# Patient Record
Sex: FEMALE | Race: WHITE | Employment: PART TIME | URBAN - METROPOLITAN AREA
[De-identification: names, ages, dates, MRNs, and addresses within clinical notes are randomized per-mention and may not be internally consistent; named-entity substitution may affect disease eponyms.]

---

## 2019-10-31 ENCOUNTER — HOSPITAL ENCOUNTER (EMERGENCY)
Age: 29
Discharge: HOME | End: 2019-10-31
Payer: COMMERCIAL

## 2019-10-31 VITALS
TEMPERATURE: 98.2 F | HEIGHT: 67 IN | RESPIRATION RATE: 16 BRPM | OXYGEN SATURATION: 98 % | HEART RATE: 104 BPM | DIASTOLIC BLOOD PRESSURE: 68 MMHG | BODY MASS INDEX: 18.05 KG/M2 | WEIGHT: 115 LBS | SYSTOLIC BLOOD PRESSURE: 125 MMHG

## 2019-10-31 DIAGNOSIS — T78.40XA ALLERGIC REACTION, INITIAL ENCOUNTER: Primary | ICD-10-CM

## 2019-10-31 PROCEDURE — 6370000000 HC RX 637 (ALT 250 FOR IP): Performed by: PHYSICIAN ASSISTANT

## 2019-10-31 PROCEDURE — 6360000002 HC RX W HCPCS: Performed by: PHYSICIAN ASSISTANT

## 2019-10-31 PROCEDURE — 99282 EMERGENCY DEPT VISIT SF MDM: CPT

## 2019-10-31 RX ORDER — DEXAMETHASONE SODIUM PHOSPHATE 10 MG/ML
10 INJECTION INTRAMUSCULAR; INTRAVENOUS ONCE
Status: COMPLETED | OUTPATIENT
Start: 2019-10-31 | End: 2019-10-31

## 2019-10-31 RX ORDER — DIPHENHYDRAMINE HCL 25 MG
25 TABLET ORAL ONCE
Status: COMPLETED | OUTPATIENT
Start: 2019-10-31 | End: 2019-10-31

## 2019-10-31 RX ORDER — FAMOTIDINE 20 MG/1
20 TABLET, FILM COATED ORAL ONCE
Status: COMPLETED | OUTPATIENT
Start: 2019-10-31 | End: 2019-10-31

## 2019-10-31 RX ORDER — DIPHENHYDRAMINE HCL 25 MG
25 CAPSULE ORAL EVERY 6 HOURS PRN
Qty: 40 CAPSULE | Refills: 0 | Status: SHIPPED | OUTPATIENT
Start: 2019-10-31 | End: 2019-11-10

## 2019-10-31 RX ORDER — FAMOTIDINE 20 MG/1
20 TABLET, FILM COATED ORAL 2 TIMES DAILY
Qty: 20 TABLET | Refills: 0 | Status: SHIPPED | OUTPATIENT
Start: 2019-10-31 | End: 2021-10-20

## 2019-10-31 RX ORDER — PREDNISONE 20 MG/1
40 TABLET ORAL DAILY
Qty: 10 TABLET | Refills: 0 | Status: SHIPPED | OUTPATIENT
Start: 2019-10-31 | End: 2019-11-05

## 2019-10-31 RX ADMIN — FAMOTIDINE 20 MG: 20 TABLET ORAL at 12:53

## 2019-10-31 RX ADMIN — DIPHENHYDRAMINE HCL 25 MG: 25 TABLET ORAL at 12:53

## 2019-10-31 RX ADMIN — DEXAMETHASONE SODIUM PHOSPHATE 10 MG: 10 INJECTION INTRAMUSCULAR; INTRAVENOUS at 12:54

## 2021-10-01 ENCOUNTER — TELEPHONE (OUTPATIENT)
Dept: BREAST CENTER | Age: 31
End: 2021-10-01

## 2021-10-01 NOTE — TELEPHONE ENCOUNTER
Attempted to reach patient in reference to her referral to the breast clinic. Unable to leave message. Letter sent to listed address in attempt to reach patient another way.

## 2021-10-11 ENCOUNTER — TELEPHONE (OUTPATIENT)
Dept: BREAST CENTER | Age: 31
End: 2021-10-11

## 2021-10-11 DIAGNOSIS — N64.9 BREAST LESION: Primary | ICD-10-CM

## 2021-10-11 NOTE — TELEPHONE ENCOUNTER
Spoke with Marylou at length. We were given the wrong contact information and that is why we could not get in touch with her. She is a new referral, imaging at [place] that is recommending left breast stereotactic biopsy. Order placed, along with information to patient with plans moving forward.  Patient appreciative and states no matter what the biopsy shows, she would like a clinical follow up with one of our providers

## 2021-10-20 ENCOUNTER — OFFICE VISIT (OUTPATIENT)
Dept: BREAST CENTER | Age: 31
End: 2021-10-20
Payer: COMMERCIAL

## 2021-10-20 ENCOUNTER — HOSPITAL ENCOUNTER (OUTPATIENT)
Dept: GENERAL RADIOLOGY | Age: 31
Discharge: HOME | End: 2021-10-22
Payer: COMMERCIAL

## 2021-10-20 VITALS
OXYGEN SATURATION: 98 % | WEIGHT: 115 LBS | RESPIRATION RATE: 18 BRPM | BODY MASS INDEX: 18.48 KG/M2 | HEIGHT: 66 IN | SYSTOLIC BLOOD PRESSURE: 110 MMHG | DIASTOLIC BLOOD PRESSURE: 68 MMHG | HEART RATE: 104 BPM | TEMPERATURE: 97.6 F

## 2021-10-20 DIAGNOSIS — N64.9 BREAST LESION: ICD-10-CM

## 2021-10-20 DIAGNOSIS — N61.1 BREAST ABSCESS: ICD-10-CM

## 2021-10-20 PROCEDURE — 99203 OFFICE O/P NEW LOW 30 MIN: CPT | Performed by: NURSE PRACTITIONER

## 2021-10-20 PROCEDURE — 76642 ULTRASOUND BREAST LIMITED: CPT

## 2021-10-20 PROCEDURE — G0279 TOMOSYNTHESIS, MAMMO: HCPCS

## 2021-10-20 RX ORDER — CLINDAMYCIN HYDROCHLORIDE 300 MG/1
300 CAPSULE ORAL 3 TIMES DAILY
Qty: 30 CAPSULE | Refills: 0 | Status: SHIPPED | OUTPATIENT
Start: 2021-10-20 | End: 2021-10-30

## 2021-10-20 RX ORDER — MULTIVIT WITH MINERALS/LUTEIN
1000 TABLET ORAL DAILY
Status: ON HOLD | COMMUNITY
End: 2022-10-12 | Stop reason: HOSPADM

## 2021-10-20 RX ORDER — LORATADINE 10 MG/1
10 CAPSULE, LIQUID FILLED ORAL DAILY
Status: ON HOLD | COMMUNITY
End: 2022-10-12 | Stop reason: HOSPADM

## 2021-10-20 RX ORDER — FLUCONAZOLE 150 MG/1
150 TABLET ORAL ONCE
Qty: 1 TABLET | Refills: 0 | Status: SHIPPED | OUTPATIENT
Start: 2021-10-20 | End: 2021-10-20

## 2021-10-20 ASSESSMENT — ENCOUNTER SYMPTOMS
TROUBLE SWALLOWING: 0
SHORTNESS OF BREATH: 0
NAUSEA: 0
ABDOMINAL DISTENTION: 0
CONSTIPATION: 0
CHEST TIGHTNESS: 0
STRIDOR: 0
SINUS PRESSURE: 0
ABDOMINAL PAIN: 0
DIARRHEA: 0
VOICE CHANGE: 0
SINUS PAIN: 0
RHINORRHEA: 0
WHEEZING: 0
BLOOD IN STOOL: 0
BACK PAIN: 0
SORE THROAT: 0
VOMITING: 0
COUGH: 0
EYE ITCHING: 0
EYE DISCHARGE: 0
CHOKING: 0

## 2021-10-20 NOTE — PROGRESS NOTES
Subjective:      Patient ID: Yoly Chand is a 36 y.o. female. HPI  History and Physical    Patient's Name/Date of Birth: Yoly Chand / 1990    Date: 2021     Yoly Chand presents for evaluation of a Left breast lesion, abscess    PCP: Shayan Garcia MD. Gynecologist:none. The lesion is located in the subareolar  position of the Left breast. The lesion was discovered by exam. Patient was on antibiotics for 2 weeks. The swelling of breast subsided while on antibiotics but then became worse once antibiotics stopped. The patient has noted a change in BSE since presentation. Patient denies nipple discharge. Patient denies a personal history of breast cancer. Breast cancer risk factors include female, third cousin on mom's side with breast cancer age 36. Ashkenazi Hol[employer] Ancestry: No.    OBSTETRIC RELATED HISTORY:  Age of menarche was 17. Age of menopause was   LMP 10/5/2021  Patient denies hormonal therapy. Patient is . Age of first live birth was 27. Patient did not breast feed. Is patient interested in fertility information about fertility preservation? No    CANCER SURVEILLANCE HISTORY:  Mammograms: Yes   Breast MRI's: No   Breast Biopsies: No   Colonoscopy: Not Applicable   GI Polyps: Not Applicable   EGD: Not Applicable   Pelvic Exam: No   Pap Smear: No 5 YEARS AGO  Dermatology: No   Lung screening: no        Estimated body mass index is 18.01 kg/m² as calculated from the following:    Height as of 10/31/19: 5' 7\" (1.702 m). Weight as of 10/31/19: 115 lb (52.2 kg). Bra Size: 36C    Because violence is so common, we ask all our patients: are you in a relationship or do you live with a person who threatens, hurts, or controls you:  DENIES    Patient drinks moderate caffeinated beverages. Patient does smoke cigarettes. Patient does not use recreational drugs.       Past Medical History:   Diagnosis Date    Anemia 2016       No past surgical history on file. Current Outpatient Medications   Medication Sig Dispense Refill    famotidine (PEPCID) 20 MG tablet Take 1 tablet by mouth 2 times daily for 10 days 20 tablet 0    ibuprofen (ADVIL;MOTRIN) 800 MG tablet Take 1 tablet by mouth every 8 hours as needed for Pain 120 tablet 3    docusate sodium (COLACE, DULCOLAX) 100 MG CAPS Take 100 mg by mouth 2 times daily 60 capsule 1    ferrous sulfate 325 (65 FE) MG tablet Take 1 tablet by mouth 2 times daily (with meals) 60 tablet 1    Prenatal MV-Min-Fe Fum-FA-DHA (PRENATAL 1 PO) Take 1 tablet by mouth       No current facility-administered medications for this visit. No Known Allergies    FH reviewed and non-contributory    Social History     Socioeconomic History    Marital status:      Spouse name: Not on file    Number of children: Not on file    Years of education: Not on file    Highest education level: Not on file   Occupational History    Not on file   Tobacco Use    Smoking status: Never Smoker   Substance and Sexual Activity    Alcohol use: No    Drug use: No    Sexual activity: Yes     Partners: Male   Other Topics Concern    Not on file   Social History Narrative    Not on file     Social Determinants of Health     Financial Resource Strain:     Difficulty of Paying Living Expenses:    Food Insecurity:     Worried About Running Out of Food in the Last Year:     [address] in the Last Year:    Transportation Needs:     Lack of Transportation (Medical):      Lack of Transportation (Non-Medical):    Physical Activity:     Days of Exercise per Week:     Minutes of Exercise per Session:    Stress:     Feeling of Stress :    Social Connections:     Frequency of Communication with Friends and Family:     Frequency of Social Gatherings with Friends and Family:     Attends [employer] Services:     Active Member of Clubs or Organizations:     Attends Club or Organization Meetings:     Marital Status:    Intimate Partner Violence:     Fear of Current or Ex-Partner:     Emotionally Abused:     Physically Abused:     Sexually Abused:        Occupation: PHARMACY TECH    Review of Systems   Constitutional: Negative for activity change, appetite change, chills, diaphoresis, fatigue, fever and unexpected weight change. Denies fever, rigors, chills. Discomfort in the central left breast.   HENT: Negative for congestion, postnasal drip, rhinorrhea, sinus pressure, sinus pain, sore throat, trouble swallowing and voice change. Eyes: Negative for discharge, itching and visual disturbance. Respiratory: Negative for cough, choking, chest tightness, shortness of breath, wheezing and stridor. Cardiovascular: Negative for chest pain, palpitations and leg swelling. Gastrointestinal: Negative for abdominal distention, abdominal pain, blood in stool, constipation, diarrhea, nausea and vomiting. Endocrine: Negative for cold intolerance and heat intolerance. Genitourinary: Negative for difficulty urinating, dysuria, frequency and hematuria. Musculoskeletal: Negative for arthralgias, back pain, gait problem, joint swelling, myalgias, neck pain and neck stiffness. Allergic/Immunologic: Negative for environmental allergies and food allergies. Neurological: Negative for dizziness, seizures, syncope, speech difficulty, weakness, light-headedness and headaches. Hematological: Negative for adenopathy. Does not bruise/bleed easily. Psychiatric/Behavioral: Negative for agitation, confusion and decreased concentration. The patient is not nervous/anxious. Patient denies previous history of DVT/PE. Objective:   Physical Exam  Vitals and nursing note reviewed. Constitutional:       General: She is not in acute distress. Appearance: Normal appearance. She is well-developed. She is not diaphoretic. Comments: ECOG 0; Does not appear acutely ill. HENT:      Head: Normocephalic and atraumatic. Mouth/Throat:      Pharynx: No oropharyngeal exudate. Eyes:      General: No scleral icterus. Right eye: No discharge. Left eye: No discharge. Conjunctiva/sclera: Conjunctivae normal.   Neck:      Thyroid: No thyromegaly. Vascular: No JVD. Trachea: No tracheal deviation. Cardiovascular:      Rate and Rhythm: Normal rate and regular rhythm. Heart sounds: No murmur heard. No friction rub. No gallop. Pulmonary:      Effort: Pulmonary effort is normal. No respiratory distress or retractions. Breath sounds: Normal breath sounds. No stridor. No wheezing or rales. Chest:      Chest wall: No mass, lacerations, deformity, swelling, tenderness or edema. Breasts: Breasts are symmetrical.         Right: No inverted nipple, mass, nipple discharge, skin change or tenderness. Left: No inverted nipple, mass, nipple discharge, skin change or tenderness. Comments: Right breast exam is unremarkable. Abdominal:      General: There is no distension. Palpations: Abdomen is soft. Tenderness: There is no abdominal tenderness. There is no guarding or rebound. Musculoskeletal:         General: No tenderness or deformity. Normal range of motion. Right shoulder: Normal.      Left shoulder: Normal.      Cervical back: Normal range of motion and neck supple. Lymphadenopathy:      Cervical: No cervical adenopathy. Right cervical: No superficial, deep or posterior cervical adenopathy. Left cervical: No superficial, deep or posterior cervical adenopathy. Upper Body:      Right upper body: No pectoral adenopathy. Left upper body: No pectoral adenopathy. Skin:     General: Skin is warm and dry. Coloration: Skin is not pale. Findings: No erythema or rash. Neurological:      Mental Status: She is alert and oriented to person, place, and time.       Coordination: Coordination normal.   Psychiatric:         Behavior: Behavior normal.         Thought Content: Thought content normal.         Judgment: Judgment normal.         Assessment:      36 y.o. extremely pleasant female without unusual risks for breast cancer who presents for evaluation of a recurrent left breast abscess. She is a current, everyday smoker. Presents for evaluation of a left breast recurrent abscess. 10/20/2021: MAMMOGRAM, LEFT AND LEFT BREAST ULTRASOUND: [facility]  FINDINGS:   Left diagnostic mammogram: Breast tissue is heterogeneously dense which may   obscure small masses. Amanda Section is a superficial mass in the left breast 9   o'clock 1 cm from the nipple.       Left diagnostic ultrasound: Targeted left breast ultrasound was performed   utilizing high-resolution, real-time scanning.  In the left breast 9 o'clock   1 cm from the nipple, there is a 2.8 x 1.5 x 4.0 cm mixed echogenicity,   intradermal lesion.  This finding is consistent with a sebaceous or   epidermoid cyst.           Impression   Intradermal lesion in the left breast 9 o'clock is probably benign.       Recommendation:       Six-month follow-up left diagnostic ultrasound is advised.       BIRADS:   BIRADS - CATEGORY 3       Probably Benign Findings. A short interval follow-up is recommended in 6   months.       OVERALL ASSESSMENT - PROBABLY BENIGN.       A letter of notification will be sent to the patient regarding the results.             9/16/2021: BILATERAL DIAGNOSTIC MAMMOGRAM AND ULTRASOUND: [facility]      Clinically, findings are consistent with a epidermal inclusion cyst-photo in media tab. We discussed that definitive treatment for epidermal inclusion cyst is a surgical excision of the cystic sac  However, discussed that excision is best accomplished when the lesion is not acutely inflamed. We discussed warm compress, good supportive bra, antibiotic therapy with clindamycin. She was also given a single dose of fluconazole in the event she develops a vaginal yeast infection.   Will plan to see her back at completion of antibiotic therapy for evaluation with Dr. Zena Rose. She was advised should she develop new or worsening symptoms, to call or go to the emergency room immediately. We briefly reviewed repeat ultrasound in 3 months due to BI-RADS Category 3. This will be necessary to ensure complete resolution of the area in question. Plan:      1. Continue monthly breast self examination; detailed instructions reviewed today. Bring any changes to your physician's attention. 2. Continue healthy diet and exercise routinely as tolerated. 3. Avoid alcohol. 4. Limit caffeine intake. 5. Repeat US in 3 months   6. Continue follow up with Primary Care. 7. Clindamycin prescription to pharmacy. 8. 2 to 3 weeks      During today's visit, face-to-face time 30 minutes, greater than 50% in counseling education and coordination of care. All questions were answered to her apparent satisfaction, and she is agreeable to the plan as outlined above. This document is generated, in part, by voice recognition software and thus syntax and grammatical errors are possible. Maria Herron, RN, MSN, APRN-CNP, [address]  Advanced Oncology Certified Nurse Practitioner  Department of Breast Surgery  [facility]/  [facility] in collaboration with Dr. Nic Geronimo.  Vivi/Dr. Kathy Elias        October 20, 2021

## 2021-10-20 NOTE — PATIENT INSTRUCTIONS

## 2021-11-05 ENCOUNTER — OFFICE VISIT (OUTPATIENT)
Dept: BREAST CENTER | Age: 31
End: 2021-11-05
Payer: COMMERCIAL

## 2021-11-05 VITALS
DIASTOLIC BLOOD PRESSURE: 74 MMHG | BODY MASS INDEX: 18.48 KG/M2 | HEART RATE: 80 BPM | OXYGEN SATURATION: 98 % | WEIGHT: 115 LBS | TEMPERATURE: 98.8 F | SYSTOLIC BLOOD PRESSURE: 120 MMHG | HEIGHT: 66 IN | RESPIRATION RATE: 12 BRPM

## 2021-11-05 DIAGNOSIS — Z51.89 VISIT FOR WOUND CHECK: ICD-10-CM

## 2021-11-05 DIAGNOSIS — L72.0 RUPTURED EPIDERMAL CYST: Primary | ICD-10-CM

## 2021-11-05 PROCEDURE — 99213 OFFICE O/P EST LOW 20 MIN: CPT | Performed by: SURGERY

## 2021-11-05 RX ORDER — MUPIROCIN CALCIUM 20 MG/G
CREAM TOPICAL
Qty: 30 G | Refills: 1 | Status: SHIPPED | OUTPATIENT
Start: 2021-11-05 | End: 2021-12-05

## 2021-11-05 NOTE — PROGRESS NOTES
[facility]  PROGRESS NOTE  ATTENDING NOTE    Chief Complaint   Patient presents with    Breast Mass     F/U breast cyst, placed on ATX by TERESO Bradley on 10/20/21. Pt states 3 nights ago lump started to drain and alot of drainage has occured since.  Fever     pt doesnt recall if had fever but had chills     S: 81-year-old female who presents with a left breast abscess/infected epidermal cyst.  She had been on amoxicillin but had a cluster yeast infection so she stopped that and then she was placed on clindamycin. She is now off antibiotics. She states a few days ago the wound opened up and has been draining thick purulent material.  She has very sensitive skin and she uses her child shampoo for body wash. She states overall she feels like it is improving. She denies fevers but has been having chills    /74 (Site: Right Upper Arm, Position: Sitting, Cuff Size: Medium Adult)   Pulse 80   Temp 98.8 °F (37.1 °C) (Temporal)   Resp 12   Ht 5' 6\" (1.676 m)   Wt 115 lb (52.2 kg)   SpO2 98%   BMI 18.56 kg/m²   Physical Exam  Constitutional:       Appearance: Normal appearance. She is normal weight. HENT:      Head: Normocephalic and atraumatic. Nose: Nose normal.      Mouth/Throat:      Mouth: Mucous membranes are moist.      Pharynx: Oropharynx is clear. Eyes:      Extraocular Movements: Extraocular movements intact. Pupils: Pupils are equal, round, and reactive to light. Cardiovascular:      Rate and Rhythm: Normal rate. Pulses: Normal pulses. Pulmonary:      Effort: Pulmonary effort is normal.   Chest:       Musculoskeletal:         General: No tenderness or signs of injury. Cervical back: Normal range of motion. Skin:     General: Skin is warm and dry. Neurological:      General: No focal deficit present. Mental Status: She is alert and oriented to person, place, and time.    Psychiatric:         Mood and Affect: Mood normal. Behavior: Behavior normal.         Thought Content: Thought content normal.         Judgment: Judgment normal.       ASSESSMENT/PLAN:  Infected epidermal inclusion cyst versus hidradenitis  --I prescribed mupirocin for the wound advised patient to put it on 2-3 times per day. --She can place a Telfa or absorbent sponge inside her bra to collect any drainage from the wound. --I advised on antibacterial soap which she is able    Return to clinic in 1 month for wound check possible excision of cyst of his heel at that point in time. Advised for her to call if this worsens at all. I explained that I do not think she needs further antibiotics at this time as it is open and draining. Explained there is a slight chance that this could be hidradenitis she states that she has had another type cyst on her breast earlier this year. However I am still leaning towards this is just 1 area that is infected and that there should be a cyst that we may be able to remove as it heals. Once the wound is completely healed if she does not feel anything in that area then she is welcome to follow-up or just call if needed.     RTC 1 months    Lakesha Garcia MD, MSc, FACS  11/5/2021  10:46 AM

## 2022-01-21 ENCOUNTER — OFFICE VISIT (OUTPATIENT)
Dept: BREAST CENTER | Age: 32
End: 2022-01-21
Payer: COMMERCIAL

## 2022-01-21 VITALS
SYSTOLIC BLOOD PRESSURE: 142 MMHG | WEIGHT: 108.2 LBS | DIASTOLIC BLOOD PRESSURE: 72 MMHG | HEIGHT: 66 IN | TEMPERATURE: 97.3 F | HEART RATE: 128 BPM | BODY MASS INDEX: 17.39 KG/M2 | OXYGEN SATURATION: 99 %

## 2022-01-21 DIAGNOSIS — Z51.89 VISIT FOR WOUND CHECK: ICD-10-CM

## 2022-01-21 PROCEDURE — 99213 OFFICE O/P EST LOW 20 MIN: CPT | Performed by: SURGERY

## 2022-01-21 NOTE — LETTER
[facility], [place], operated by [facility]  [phone] [address] [place] [address]  Nola Lopez [address]  Phone: [phone]  Fax: Dora Ramirez MD        January 21, 2022     Patient: Mata Carvalho   YOB: 1990   Date of Visit: 1/21/2022       To Whom It May Concern: It is my medical opinion that Dotty may return to full duty immediately with no restrictions. She had an sebaceous cyst on her left breast that has now healed. The mupirocin that was prescribed was to aid in the healing process. There is NO cancer in her breasts. The wounds are fully healed and nothing further to do. I have included her most recent mammogram and ultrasounds. If you have any questions or concerns, please don't hesitate to call.     Sincerely,        Carmen Tao MD

## 2022-01-21 NOTE — PROGRESS NOTES
[facility]  PROGRESS NOTE  ATTENDING NOTE    Chief Complaint   Patient presents with    Wound Check     wound check, pt still feels lump in left breast, in office procedure    Other     PCP recommended biopsy    Other     registering w/ the [employer], requiring paperwork     Other     states a lot has drained out, states still feels lumps though. states it has closed     Pain     states some discomfort if she bumps it or presses on it      S: 53-year-old female with a history of left breast sebaceous cyst.  She was seen in the office November. The wound has since closed up. She stopped using the mupirocin has since it is close. He still feels a little tender to the touch and she still feels like there is something there. She denies any nipple discharge. She denies fevers or chills. BP (!) 142/72 (Site: Right Upper Arm, Position: Sitting, Cuff Size: Large Adult)   Pulse 128   Temp 97.3 °F (36.3 °C) (Infrared)   Ht 5' 6\" (1.676 m)   Wt 108 lb 3.2 oz (49.1 kg)   SpO2 99%   BMI 17.46 kg/m²   Physical Exam  Constitutional:       Appearance: Normal appearance. HENT:      Head: Normocephalic and atraumatic. Nose: Nose normal.      Mouth/Throat:      Mouth: Mucous membranes are moist.      Pharynx: Oropharynx is clear. Eyes:      Extraocular Movements: Extraocular movements intact. Pupils: Pupils are equal, round, and reactive to light. Cardiovascular:      Rate and Rhythm: Normal rate. Pulses: Normal pulses. Pulmonary:      Effort: Pulmonary effort is normal.   Chest:   Breasts:      Right: No swelling, bleeding, inverted nipple, mass, nipple discharge, skin change, tenderness, axillary adenopathy or supraclavicular adenopathy. Left: Skin change (scar) and tenderness present. No swelling, bleeding, inverted nipple, mass, nipple discharge, axillary adenopathy or supraclavicular adenopathy.          Musculoskeletal:         General: No tenderness or signs of injury. Cervical back: Normal range of motion and neck supple. Lymphadenopathy:      Cervical: No cervical adenopathy. Right cervical: No superficial cervical adenopathy. Left cervical: No superficial cervical adenopathy. Upper Body:      Right upper body: No supraclavicular or axillary adenopathy. Left upper body: No supraclavicular or axillary adenopathy. Skin:     General: Skin is warm and dry. Neurological:      General: No focal deficit present. Mental Status: She is alert and oriented to person, place, and time. Psychiatric:         Mood and Affect: Mood normal.         Behavior: Behavior normal.         Thought Content: Thought content normal.         Judgment: Judgment normal.         ASSESSMENT/PLAN:  Left breast sebaceous cyst--now completely healed  --No evidence of cancer  --Okay for scar massage  --Okay to apply lotion or Neosporin if patient desires. --I explained to patient that there is no mass that I can easily remove in office with a punch biopsy. I explained to her if I took her surgery remove the scarred area in the little bit of thickened area that she would probably live with a cosmetic defect for a benign process. I explained to her that she should discontinue her monthly self breast examinations and bring any changes to my attention.      Return to clinic as needed    Leidy Ponce MD, MSc, FACS  1/21/2022  3:06 PM

## 2022-02-11 ENCOUNTER — TELEPHONE (OUTPATIENT)
Dept: BREAST CENTER | Age: 32
End: 2022-02-11

## 2022-02-11 NOTE — PROGRESS NOTES
change, axillary adenopathy or supraclavicular adenopathy. Left: Skin change (scar) and tenderness present. No swelling, bleeding, inverted nipple, mass, nipple discharge, axillary adenopathy or supraclavicular adenopathy. Comments: Right breast exam is unremarkable aside from tenderness to palpation. Musculoskeletal:         General: No tenderness or signs of injury. Cervical back: Normal range of motion and neck supple. Lymphadenopathy:      Cervical: No cervical adenopathy. Right cervical: No superficial cervical adenopathy. Left cervical: No superficial cervical adenopathy. Upper Body:      Right upper body: No supraclavicular or axillary adenopathy. Left upper body: No supraclavicular or axillary adenopathy. Skin:     General: Skin is warm and dry. Neurological:      General: No focal deficit present. Mental Status: She is alert and oriented to person, place, and time. Psychiatric:         Mood and Affect: Mood normal.         Behavior: Behavior normal.         Thought Content: Thought content normal.         Judgment: Judgment normal.       ASSESSMENT/PLAN:  Left breast sebaceous cyst  -Focal residual erythema at the 9:00 position of the left breast; 1 cm FTN.  --Overall this is improved compared to prior visit. --Family history reviewed in detail on this visit. She has no first or second degree relatives with breast cancer. --We reviewed clinical findings and imaging to date. After further discussion, she would like to proceed with a biopsy of the left breast, 9 o'clock position and 1 cm from the nipple. She also complains of intermittent swelling of the left axilla; will plan to do left axillary ultrasound same day.  -Of note, she is due to start her cycle soon. When ask if there would be any chance she could be pregnant she reports its possible.   She was advised to inform us prior to any biopsy or imaging if she has not started her cycle and we

## 2022-02-11 NOTE — TELEPHONE ENCOUNTER
We received a phone call from Bhavesh Stevenson yesterday. She left a voicemail regarding her primary care doctor wanted a biopsy of her left breast.  I reviewed her chart and we had just seen her for sebaceous cyst.  I called her primary care doctor Dr. Noemy Blanca and reviewed the patient with him. He was very concerned that the sebaceous cyst took a long time to heal.  I assured him that that is normal.  He also was concerned that it was bloody drainage. I also assured him that that was normal when you squeeze it. I explained that we will get concerned if there is bloody drainage from the nipple itself. He states that she is having some lateral pain in her sebaceous cyst had been medial.  He is concerned that there might be something deeper that were missing. I explained to him that if he is finding examination findings that are different than what her prior imaging has shown we should probably get an MRI. We will call her to set up a appointment in the office to be seen soon and then get an MRI for disconcordant findings.

## 2022-02-11 NOTE — TELEPHONE ENCOUNTER
LIAN Beasley contacted patient to schedule an appointment for follow up per . MA scheduled pt for 02/15/2022 @ 2:15pm with TERESO Zuluaga in [facility]. Patient verbalized appointment date, time and location. MA verified number that was good to do reminder call was the one listed in chart. MA advised patient where to park and enter building for appointment.        Electronically signed by Tom Iqbal MA on 2/11/22 at 10:23 AM EST

## 2022-02-14 ENCOUNTER — OFFICE VISIT (OUTPATIENT)
Dept: BREAST CENTER | Age: 32
End: 2022-02-14
Payer: COMMERCIAL

## 2022-02-14 VITALS
OXYGEN SATURATION: 98 % | HEIGHT: 66 IN | TEMPERATURE: 97.8 F | DIASTOLIC BLOOD PRESSURE: 58 MMHG | RESPIRATION RATE: 18 BRPM | WEIGHT: 109 LBS | SYSTOLIC BLOOD PRESSURE: 110 MMHG | BODY MASS INDEX: 17.52 KG/M2 | HEART RATE: 100 BPM

## 2022-02-14 DIAGNOSIS — N63.0 BREAST LUMP: ICD-10-CM

## 2022-02-14 DIAGNOSIS — M79.89 SWELLING IN LEFT ARMPIT: Primary | ICD-10-CM

## 2022-02-14 PROCEDURE — 99213 OFFICE O/P EST LOW 20 MIN: CPT | Performed by: NURSE PRACTITIONER

## 2022-02-14 PROCEDURE — 99212 OFFICE O/P EST SF 10 MIN: CPT | Performed by: NURSE PRACTITIONER

## 2022-02-14 RX ORDER — CEFDINIR 300 MG/1
CAPSULE ORAL
COMMUNITY
Start: 2022-02-09 | End: 2022-09-12

## 2022-02-14 NOTE — PATIENT INSTRUCTIONS
RELEASE OF RESULTS AND RECORDS  As of October 1, 2020, all results (x-ray reports, labwork, pathology reports) will be released through [address] in real time per federal law. Once your test results are final, they will be automatically released to your electronic medical record [employer] where you will be able to see those results and any clinical notes associated with that result. In some cases, you may see those results and notes before your provider or the staff have had a chance to review. We will make every effort to contact you, especially about any abnormal or confusing results. If you view a result before we have contacted you, please wait up to one business day for us to reach you before calling with questions. If anything in your notes seems inaccurate, please message us through [employer] with potential corrections. If you see a term or language in your clinical note that doesn't make sense, please use the online health library reference tool in your MyChart (under the Health tab)  to help you interpret the note. ULTRASOUND GUIDED LEFT BREAST BIOPSY AND LEFT AXILLARY US WILL BE SCHEDULED.  THEY WILL CALL TO SCHEDULE

## 2022-02-21 ENCOUNTER — HOSPITAL ENCOUNTER (OUTPATIENT)
Dept: GENERAL RADIOLOGY | Age: 32
Discharge: HOME | End: 2022-02-23
Payer: COMMERCIAL

## 2022-02-21 ENCOUNTER — HOSPITAL ENCOUNTER (OUTPATIENT)
Dept: HOSPITAL 83 - LAB | Age: 32
Discharge: HOME | End: 2022-02-21
Attending: FAMILY MEDICINE
Payer: COMMERCIAL

## 2022-02-21 DIAGNOSIS — M79.89 SWELLING IN LEFT ARMPIT: ICD-10-CM

## 2022-02-21 DIAGNOSIS — Z32.01: Primary | ICD-10-CM

## 2022-02-21 DIAGNOSIS — N63.0 BREAST LUMP: ICD-10-CM

## 2022-02-21 DIAGNOSIS — N63.20 LEFT BREAST LUMP: ICD-10-CM

## 2022-02-21 PROCEDURE — 76642 ULTRASOUND BREAST LIMITED: CPT

## 2022-02-22 ENCOUNTER — TELEPHONE (OUTPATIENT)
Dept: BREAST CENTER | Age: 32
End: 2022-02-22

## 2022-02-22 NOTE — TELEPHONE ENCOUNTER
----- Message from Fred Square, TRICIA - CNP sent at 2/21/2022 12:51 PM EST -----  Johnie Laboy,  This is the patient that wanted a biopsy of her breast but found out she is pregnant. Dr. Joshua Lynne assigned a repeat US a BI-RADS 2. As long as she has no new or worsening complaints, RTC PRN.     Thank you,    t

## 2022-02-22 NOTE — TELEPHONE ENCOUNTER
RN contacted patient and notified of results. Patient stated she was notified of this information yesterday and will call office if she needs us. RN verbalized understanding.

## 2022-02-23 ENCOUNTER — HOSPITAL ENCOUNTER (OUTPATIENT)
Dept: HOSPITAL 83 - LAB | Age: 32
Discharge: HOME | End: 2022-02-23
Attending: FAMILY MEDICINE
Payer: COMMERCIAL

## 2022-02-23 DIAGNOSIS — Z33.1: Primary | ICD-10-CM

## 2022-06-16 ENCOUNTER — HOSPITAL ENCOUNTER (EMERGENCY)
Dept: HOSPITAL 83 - ED | Age: 32
Discharge: HOME | End: 2022-06-16
Payer: COMMERCIAL

## 2022-06-16 VITALS — HEIGHT: 55 IN | WEIGHT: 121 LBS

## 2022-06-16 DIAGNOSIS — E87.6: ICD-10-CM

## 2022-06-16 DIAGNOSIS — R42: Primary | ICD-10-CM

## 2022-06-16 LAB
ALP SERPL-CCNC: 72 U/L (ref 45–117)
ALT SERPL W P-5'-P-CCNC: 13 U/L (ref 12–78)
APTT PPP: 26.4 SECONDS (ref 20–32.1)
AST SERPL-CCNC: 12 IU/L (ref 3–35)
BASOPHILS # BLD AUTO: 0 10*3/UL (ref 0–0.1)
BASOPHILS NFR BLD AUTO: 0.3 % (ref 0–1)
BUN SERPL-MCNC: 9 MG/DL (ref 7–24)
CHLORIDE SERPL-SCNC: 107 MMOL/L (ref 98–107)
CREAT SERPL-MCNC: 0.47 MG/DL (ref 0.55–1.02)
EOSINOPHIL # BLD AUTO: 0.1 10*3/UL (ref 0–0.4)
EOSINOPHIL # BLD AUTO: 1 % (ref 1–4)
EPI CELLS #/AREA URNS HPF: (no result) /[HPF]
ERYTHROCYTE [DISTWIDTH] IN BLOOD BY AUTOMATED COUNT: 13.2 % (ref 0–14.5)
HCT VFR BLD AUTO: 32.6 % (ref 37–47)
INR BLD: 0.9 (ref 2–3.5)
LEUKOCYTE ESTERASE UR QL STRIP: (no result)
LIPASE SERPL-CCNC: 213 U/L (ref 73–393)
LYMPHOCYTES # BLD AUTO: 1.7 10*3/UL (ref 1.3–4.4)
LYMPHOCYTES NFR BLD AUTO: 15.5 % (ref 27–41)
MCH RBC QN AUTO: 32 PG (ref 27–31)
MCHC RBC AUTO-ENTMCNC: 33.1 G/DL (ref 33–37)
MCV RBC AUTO: 96.4 FL (ref 81–99)
MONOCYTES # BLD AUTO: 0.9 10*3/UL (ref 0.1–1)
MONOCYTES NFR BLD MANUAL: 8.3 % (ref 3–9)
NEUT #: 8.1 10*3/UL (ref 2.3–7.9)
NEUT %: 74.4 % (ref 47–73)
NRBC BLD QL AUTO: 0 % (ref 0–0)
PH UR STRIP: 6.5 [PH] (ref 4.5–8)
PLATELET # BLD AUTO: 230 10*3/UL (ref 130–400)
PMV BLD AUTO: 10 FL (ref 9.6–12.3)
POTASSIUM SERPL-SCNC: 3.4 MMOL/L (ref 3.5–5.1)
PROT SERPL-MCNC: 6.7 GM/DL (ref 6.4–8.2)
RBC # BLD AUTO: 3.38 10*6/UL (ref 4.1–5.1)
SODIUM SERPL-SCNC: 137 MMOL/L (ref 136–145)
SP GR UR: <= 1.005 (ref 1–1.03)
UROBILINOGEN UR STRIP-MCNC: 0.2 E.U./DL (ref 0–1)
WBC NRBC COR # BLD AUTO: 10.9 10*3/UL (ref 4.8–10.8)

## 2022-08-19 ENCOUNTER — HOSPITAL ENCOUNTER (OUTPATIENT)
Age: 32
Setting detail: OBSERVATION
Discharge: HOME | End: 2022-08-20
Attending: OBSTETRICS & GYNECOLOGY | Admitting: OBSTETRICS & GYNECOLOGY
Payer: COMMERCIAL

## 2022-08-19 ENCOUNTER — HOSPITAL ENCOUNTER (EMERGENCY)
Dept: HOSPITAL 83 - ED | Age: 32
Discharge: NURSING, REHAB OR LONG-TERM CARE | End: 2022-08-19
Payer: COMMERCIAL

## 2022-08-19 VITALS — HEIGHT: 55 IN | WEIGHT: 132 LBS

## 2022-08-19 DIAGNOSIS — Z3A.32: ICD-10-CM

## 2022-08-19 DIAGNOSIS — O60.03: Primary | ICD-10-CM

## 2022-08-19 DIAGNOSIS — Z79.2: ICD-10-CM

## 2022-08-19 DIAGNOSIS — Z86.14: ICD-10-CM

## 2022-08-19 PROCEDURE — 99219 PR INITIAL OBSERVATION CARE/DAY 50 MINUTES: CPT | Performed by: STUDENT IN AN ORGANIZED HEALTH CARE EDUCATION/TRAINING PROGRAM

## 2022-08-19 PROCEDURE — 96360 HYDRATION IV INFUSION INIT: CPT

## 2022-08-19 PROCEDURE — 2580000003 HC RX 258: Performed by: OBSTETRICS & GYNECOLOGY

## 2022-08-19 RX ORDER — SODIUM CHLORIDE, SODIUM LACTATE, POTASSIUM CHLORIDE, AND CALCIUM CHLORIDE .6; .31; .03; .02 G/100ML; G/100ML; G/100ML; G/100ML
2000 INJECTION, SOLUTION INTRAVENOUS ONCE
Status: COMPLETED | OUTPATIENT
Start: 2022-08-19 | End: 2022-08-20

## 2022-08-19 RX ADMIN — SODIUM CHLORIDE, POTASSIUM CHLORIDE, SODIUM LACTATE AND CALCIUM CHLORIDE 2000 ML: 600; 310; 30; 20 INJECTION, SOLUTION INTRAVENOUS at 23:12

## 2022-08-20 VITALS
HEART RATE: 82 BPM | DIASTOLIC BLOOD PRESSURE: 53 MMHG | OXYGEN SATURATION: 99 % | RESPIRATION RATE: 16 BRPM | TEMPERATURE: 98.4 F | SYSTOLIC BLOOD PRESSURE: 92 MMHG

## 2022-08-20 PROBLEM — Z3A.31 31 WEEKS GESTATION OF PREGNANCY: Status: ACTIVE | Noted: 2022-08-20

## 2022-08-20 PROCEDURE — G0378 HOSPITAL OBSERVATION PER HR: HCPCS

## 2022-08-20 PROCEDURE — 96361 HYDRATE IV INFUSION ADD-ON: CPT

## 2022-08-20 RX ORDER — SODIUM CHLORIDE, SODIUM LACTATE, POTASSIUM CHLORIDE, CALCIUM CHLORIDE 600; 310; 30; 20 MG/100ML; MG/100ML; MG/100ML; MG/100ML
INJECTION, SOLUTION INTRAVENOUS CONTINUOUS
Status: DISCONTINUED | OUTPATIENT
Start: 2022-08-20 | End: 2022-08-20 | Stop reason: HOSPADM

## 2022-08-20 NOTE — PROGRESS NOTES
Upon repeat SVE, patient cervix is unchanged, however patient still having regular ctx 6/10 on pain scale. Per verbal orders from house officer, patient is to stay for observation to r/o  labor. Possible MFM consult in the morning.

## 2022-08-20 NOTE — PROGRESS NOTES
Verbal and written discharge instructions provided. Patient advised to keep her upcoming appointment at the office this week. Patient verbalized understanding. Ambulated off the unit independently at this time.

## 2022-08-20 NOTE — PROGRESS NOTES
Patient is a  with hx of  delivery presenting to antepartum with c/o contractions every 5-25 minutes. Denies any vaginal bleeding or leaking of fluids. States +fm. Efm applied, call light within reach.

## 2022-08-20 NOTE — H&P
Department of Obstetrics and Gynecology  Labor and Delivery  Attending History and Pysical       Subjective:   Ethel Funk is a 29 y.o. female [id number] at 27w3d here for PTL  She reports contractions q2-4 mins. She has hx of  delivery in her first preg at 32 weeks. Reports this feels like that     -LOF, +VB, -FM, +Contractions  Current pregnancy: uncomplicated otherwise. Not on progesterone  Previous cervical exam not available  No hx of asthma, HTN. No preeclampsia symptoms of HA, visual changes,  RUQ pain. OB History    Para Term  AB Living   3 2 1 1   2   SAB IAB Ectopic Molar Multiple Live Births             2      # Outcome Date GA Lbr Dario/2nd Weight Sex Delivery Anes PTL Lv   3 Current            2 Term     M Vag-Spont   ANDREW   1      M Vag-Spont   ANDREW       Review of Systems  Skin: no changes  All other review of systems negative    Past Medical History  Past Medical History:   Diagnosis Date    Anemia 2016       Past Surgical History  Past Surgical History:   Procedure Laterality Date    OTHER SURGICAL HISTORY      removal infection       Allergies  Allergies   Allergen Reactions    Amoxicillin      Yeast infection    Penicillins Hives       Family History: Non contributory    Social History: Denies smoking, alcohol, drugs    Physical Exam:    CONSTITUTIONAL:  awake, alert, cooperative, no apparent distress  LUNGS:  No increased work of breathing, CTAB  CARDIOVASCULAR:  RRR  ABDOMEN:  no rebound or guarding . EXTREMETIES:  Minimal edema. PELVIC: Normal external genitalia. Vaginal canal without blood or pooling. Cervix normal.     SVE: 1cm    FHRT:  Baseline: normal  Variability: Moderate  Accels: Present  Decels: None     [place]: q2-4     Membranes: Intact       Labs:  No visits with results within 10 Day(s) from this visit.    Latest known visit with results is:   Initial Prenatal on 2022   Component Date Value Ref Range Status    Glucose, UA POC 2022 neg   Final    Bilirubin, UA 2022 neg   Final    Ketones, UA 2022 neg   Final    Spec Grav, UA 2022 1.010   Final    Blood, UA POC 2022 neg   Final    pH, UA 2022 6.5   Final    Protein, UA POC 2022 neg   Final    Urobilinogen, UA 2022 0.2   Final    Leukocytes, UA 2022 neg   Final    Nitrite, UA 2022 neg   Final    Preg Test, Ur 2022 positive   Final    Rh Factor, External Result 2022 positive   Final    HIV, External Result 2022 neg   Final    RPR, External Result 2022 neg   Final    ABO, External Result 2022 o   Final    Hep B, External Result 2022 neg   Final    Rubella Titer, External Result 2022 immune   Final    C. Trachomatis, External Result 2022 neg   Final    N. Gonorrhoeae, External Result 2022 neg   Final       Assessment/Plan:        Khadijah King is a 29 y.o. [id number] at 31w1d here with  contractions. Reactive NST. Laisa q2-4 mins. Given hx of prior  labor, plan to be conservative in management. Repeat check in 2 hours. Plan for IV bolus. If no cervical change and contractions space out after IV bolus, then can consider d/c home. If cervix is changing then BMTZ and Mg for neuroprophylaxis.  who is covering for Montez Wilson contacted.  Agrees with plan      Enoc Nelson MD, MPH

## 2022-08-20 NOTE — PROGRESS NOTES
Spoke with Dr. Li. Updated that cervix was unchanged and patient is no longer oly. Per Dr. Li, patient may be discharged at this time.

## 2022-10-07 ENCOUNTER — HOSPITAL ENCOUNTER (OUTPATIENT)
Dept: HOSPITAL 83 - LAB | Age: 32
End: 2022-10-07
Attending: FAMILY MEDICINE
Payer: COMMERCIAL

## 2022-10-07 DIAGNOSIS — K52.9: Primary | ICD-10-CM

## 2022-10-10 ENCOUNTER — HOSPITAL ENCOUNTER (INPATIENT)
Age: 32
LOS: 2 days | Discharge: HOME | DRG: 560 | End: 2022-10-12
Attending: OBSTETRICS & GYNECOLOGY | Admitting: OBSTETRICS & GYNECOLOGY
Payer: COMMERCIAL

## 2022-10-10 ENCOUNTER — ANESTHESIA (OUTPATIENT)
Dept: LABOR AND DELIVERY | Age: 32
End: 2022-10-10

## 2022-10-10 ENCOUNTER — ANESTHESIA EVENT (OUTPATIENT)
Dept: LABOR AND DELIVERY | Age: 32
End: 2022-10-10

## 2022-10-10 PROBLEM — Z3A.38 38 WEEKS GESTATION OF PREGNANCY: Status: ACTIVE | Noted: 2022-10-10

## 2022-10-10 LAB
ABO/RH: NORMAL
AMPHETAMINE SCREEN, URINE: NOT DETECTED
ANTIBODY SCREEN: NORMAL
BARBITURATE SCREEN URINE: NOT DETECTED
BENZODIAZEPINE SCREEN, URINE: NOT DETECTED
CANNABINOID SCREEN URINE: NOT DETECTED
COCAINE METABOLITE SCREEN URINE: NOT DETECTED
FENTANYL SCREEN, URINE: NOT DETECTED
HCT VFR BLD CALC: 35.8 % (ref 34–48)
HEMOGLOBIN: 11.1 G/DL (ref 11.5–15.5)
Lab: NORMAL
MCH RBC QN AUTO: 27.8 PG (ref 26–35)
MCHC RBC AUTO-ENTMCNC: 31 % (ref 32–34.5)
MCV RBC AUTO: 89.5 FL (ref 80–99.9)
METHADONE SCREEN, URINE: NOT DETECTED
OPIATE SCREEN URINE: NOT DETECTED
OXYCODONE URINE: NOT DETECTED
PDW BLD-RTO: 16 FL (ref 11.5–15)
PHENCYCLIDINE SCREEN URINE: NOT DETECTED
PLATELET # BLD: 206 E9/L (ref 130–450)
PMV BLD AUTO: 11.1 FL (ref 7–12)
RBC # BLD: 4 E12/L (ref 3.5–5.5)
WBC # BLD: 11.3 E9/L (ref 4.5–11.5)

## 2022-10-10 PROCEDURE — 86901 BLOOD TYPING SEROLOGIC RH(D): CPT

## 2022-10-10 PROCEDURE — 80307 DRUG TEST PRSMV CHEM ANLYZR: CPT

## 2022-10-10 PROCEDURE — 1220000000 HC SEMI PRIVATE OB R&B

## 2022-10-10 PROCEDURE — 99222 1ST HOSP IP/OBS MODERATE 55: CPT | Performed by: STUDENT IN AN ORGANIZED HEALTH CARE EDUCATION/TRAINING PROGRAM

## 2022-10-10 PROCEDURE — 36415 COLL VENOUS BLD VENIPUNCTURE: CPT

## 2022-10-10 PROCEDURE — 86850 RBC ANTIBODY SCREEN: CPT

## 2022-10-10 PROCEDURE — 86900 BLOOD TYPING SEROLOGIC ABO: CPT

## 2022-10-10 PROCEDURE — 85027 COMPLETE CBC AUTOMATED: CPT

## 2022-10-10 PROCEDURE — 6370000000 HC RX 637 (ALT 250 FOR IP): Performed by: OBSTETRICS & GYNECOLOGY

## 2022-10-10 PROCEDURE — 7200000001 HC VAGINAL DELIVERY

## 2022-10-10 PROCEDURE — 2580000003 HC RX 258: Performed by: OBSTETRICS & GYNECOLOGY

## 2022-10-10 PROCEDURE — 6360000002 HC RX W HCPCS: Performed by: OBSTETRICS & GYNECOLOGY

## 2022-10-10 RX ORDER — CARBOPROST TROMETHAMINE 250 UG/ML
250 INJECTION, SOLUTION INTRAMUSCULAR PRN
Status: DISCONTINUED | OUTPATIENT
Start: 2022-10-10 | End: 2022-10-10

## 2022-10-10 RX ORDER — ACETAMINOPHEN 650 MG
TABLET, EXTENDED RELEASE ORAL
Status: COMPLETED
Start: 2022-10-10 | End: 2022-10-10

## 2022-10-10 RX ORDER — SODIUM CHLORIDE 0.9 % (FLUSH) 0.9 %
5-40 SYRINGE (ML) INJECTION EVERY 12 HOURS SCHEDULED
Status: DISCONTINUED | OUTPATIENT
Start: 2022-10-10 | End: 2022-10-12 | Stop reason: HOSPADM

## 2022-10-10 RX ORDER — ACETAMINOPHEN 500 MG
1000 TABLET ORAL EVERY 8 HOURS PRN
Status: DISCONTINUED | OUTPATIENT
Start: 2022-10-10 | End: 2022-10-12 | Stop reason: HOSPADM

## 2022-10-10 RX ORDER — MISOPROSTOL 200 UG/1
800 TABLET ORAL PRN
Status: DISCONTINUED | OUTPATIENT
Start: 2022-10-10 | End: 2022-10-10

## 2022-10-10 RX ORDER — SODIUM CHLORIDE, SODIUM LACTATE, POTASSIUM CHLORIDE, AND CALCIUM CHLORIDE .6; .31; .03; .02 G/100ML; G/100ML; G/100ML; G/100ML
500 INJECTION, SOLUTION INTRAVENOUS PRN
Status: DISCONTINUED | OUTPATIENT
Start: 2022-10-10 | End: 2022-10-10

## 2022-10-10 RX ORDER — LIDOCAINE HYDROCHLORIDE 10 MG/ML
INJECTION, SOLUTION INFILTRATION; PERINEURAL
Status: DISCONTINUED
Start: 2022-10-10 | End: 2022-10-10

## 2022-10-10 RX ORDER — SODIUM CHLORIDE, SODIUM LACTATE, POTASSIUM CHLORIDE, CALCIUM CHLORIDE 600; 310; 30; 20 MG/100ML; MG/100ML; MG/100ML; MG/100ML
INJECTION, SOLUTION INTRAVENOUS CONTINUOUS
Status: DISCONTINUED | OUTPATIENT
Start: 2022-10-10 | End: 2022-10-10

## 2022-10-10 RX ORDER — MODIFIED LANOLIN
OINTMENT (GRAM) TOPICAL PRN
Status: DISCONTINUED | OUTPATIENT
Start: 2022-10-10 | End: 2022-10-12 | Stop reason: HOSPADM

## 2022-10-10 RX ORDER — SODIUM CHLORIDE 0.9 % (FLUSH) 0.9 %
5-40 SYRINGE (ML) INJECTION PRN
Status: DISCONTINUED | OUTPATIENT
Start: 2022-10-10 | End: 2022-10-12 | Stop reason: HOSPADM

## 2022-10-10 RX ORDER — SODIUM CHLORIDE, SODIUM LACTATE, POTASSIUM CHLORIDE, CALCIUM CHLORIDE 600; 310; 30; 20 MG/100ML; MG/100ML; MG/100ML; MG/100ML
INJECTION, SOLUTION INTRAVENOUS CONTINUOUS
Status: DISCONTINUED | OUTPATIENT
Start: 2022-10-10 | End: 2022-10-12 | Stop reason: HOSPADM

## 2022-10-10 RX ORDER — IBUPROFEN 800 MG/1
800 TABLET ORAL EVERY 8 HOURS PRN
Status: DISCONTINUED | OUTPATIENT
Start: 2022-10-10 | End: 2022-10-12 | Stop reason: HOSPADM

## 2022-10-10 RX ORDER — SODIUM CHLORIDE, SODIUM LACTATE, POTASSIUM CHLORIDE, AND CALCIUM CHLORIDE .6; .31; .03; .02 G/100ML; G/100ML; G/100ML; G/100ML
1000 INJECTION, SOLUTION INTRAVENOUS PRN
Status: DISCONTINUED | OUTPATIENT
Start: 2022-10-10 | End: 2022-10-10

## 2022-10-10 RX ORDER — METHYLERGONOVINE MALEATE 0.2 MG/ML
200 INJECTION INTRAVENOUS PRN
Status: DISCONTINUED | OUTPATIENT
Start: 2022-10-10 | End: 2022-10-10

## 2022-10-10 RX ORDER — SODIUM CHLORIDE 9 MG/ML
INJECTION, SOLUTION INTRAVENOUS PRN
Status: DISCONTINUED | OUTPATIENT
Start: 2022-10-10 | End: 2022-10-12 | Stop reason: HOSPADM

## 2022-10-10 RX ORDER — DOCUSATE SODIUM 100 MG/1
100 CAPSULE, LIQUID FILLED ORAL 2 TIMES DAILY
Status: DISCONTINUED | OUTPATIENT
Start: 2022-10-10 | End: 2022-10-12 | Stop reason: HOSPADM

## 2022-10-10 RX ORDER — DOCUSATE SODIUM 100 MG/1
100 CAPSULE, LIQUID FILLED ORAL 2 TIMES DAILY
Status: DISCONTINUED | OUTPATIENT
Start: 2022-10-10 | End: 2022-10-10

## 2022-10-10 RX ORDER — ONDANSETRON 2 MG/ML
4 INJECTION INTRAMUSCULAR; INTRAVENOUS EVERY 6 HOURS PRN
Status: DISCONTINUED | OUTPATIENT
Start: 2022-10-10 | End: 2022-10-10

## 2022-10-10 RX ORDER — HYDROCODONE BITARTRATE AND ACETAMINOPHEN 5; 325 MG/1; MG/1
1 TABLET ORAL EVERY 4 HOURS PRN
Status: DISCONTINUED | OUTPATIENT
Start: 2022-10-10 | End: 2022-10-12 | Stop reason: HOSPADM

## 2022-10-10 RX ADMIN — SODIUM CHLORIDE, POTASSIUM CHLORIDE, SODIUM LACTATE AND CALCIUM CHLORIDE: 600; 310; 30; 20 INJECTION, SOLUTION INTRAVENOUS at 14:30

## 2022-10-10 RX ADMIN — Medication: at 16:15

## 2022-10-10 RX ADMIN — ACETAMINOPHEN 1000 MG: 500 TABLET ORAL at 16:58

## 2022-10-10 RX ADMIN — HYDROCODONE BITARTRATE AND ACETAMINOPHEN 1 TABLET: 5; 325 TABLET ORAL at 19:51

## 2022-10-10 RX ADMIN — IBUPROFEN 800 MG: 800 TABLET, FILM COATED ORAL at 21:41

## 2022-10-10 RX ADMIN — METHYLERGONOVINE MALEATE 200 MCG: 0.2 INJECTION, SOLUTION INTRAMUSCULAR; INTRAVENOUS at 16:42

## 2022-10-10 ASSESSMENT — PAIN DESCRIPTION - LOCATION: LOCATION: ABDOMEN

## 2022-10-10 ASSESSMENT — PAIN DESCRIPTION - DESCRIPTORS: DESCRIPTORS: CRAMPING;SORE

## 2022-10-10 ASSESSMENT — PAIN SCALES - GENERAL
PAINLEVEL_OUTOF10: 8
PAINLEVEL_OUTOF10: 7
PAINLEVEL_OUTOF10: 8

## 2022-10-10 ASSESSMENT — PAIN DESCRIPTION - ORIENTATION: ORIENTATION: LOWER

## 2022-10-10 ASSESSMENT — LIFESTYLE VARIABLES: SMOKING_STATUS: 1

## 2022-10-10 ASSESSMENT — PAIN - FUNCTIONAL ASSESSMENT: PAIN_FUNCTIONAL_ASSESSMENT: ACTIVITIES ARE NOT PREVENTED

## 2022-10-10 NOTE — ANESTHESIA PRE PROCEDURE
Department of Anesthesiology  Preprocedure Note       Name:  Gino Tavares   Age:  29 y.o.  :  1990                                          MRN:  [id number]         Date:  10/10/2022      Surgeon: Jersey Barragan    Procedure: Labor Epidural    Medications prior to admission:   Prior to Admission medications    Medication Sig Start Date End Date Taking?  Authorizing Provider   Prenatal Vit-Fe Fumarate-FA (PRENATAL VITAMINS PO) Take by mouth    Historical Provider, MD   Ascorbic Acid (VITAMIN C) 250 MG tablet Take 1,000 mg by mouth daily    Historical Provider, MD   loratadine (CLARITIN) 10 MG capsule Take 10 mg by mouth daily    Historical Provider, MD   ferrous sulfate 325 (65 FE) MG tablet Take 1 tablet by mouth 2 times daily (with meals) 16   TRICIA Neri CNM       Current medications:    Current Facility-Administered Medications   Medication Dose Route Frequency Provider Last Rate Last Admin    lactated ringers infusion   IntraVENous Continuous Tonny Alva MD        lactated ringers bolus  500 mL IntraVENous PRN Tonny Alva MD        Or    lactated ringers bolus  1,000 mL IntraVENous PRN Tonny Alva MD        methylergonovine (METHERGINE) injection 200 mcg  200 mcg IntraMUSCular PRN Tonny Alva MD        carboprost (HEMABATE) injection 250 mcg  250 mcg IntraMUSCular PRN Tonny Alva MD        miSOPROStol (CYTOTEC) tablet 800 mcg  800 mcg Rectal PRN Tonny Alva MD        tranexamic acid (CYCLOKAPRON) 1000 mg in sodium chloride 0.9 % 50 mL IVPB  1,000 mg IntraVENous Once PRN Tonny Alva MD        oxytocin (PITOCIN) 30 units in 500 mL infusion  87.3 jessie-units/min IntraVENous Continuous PRN Tonny Alva MD        And    oxytocin (PITOCIN) 10 unit bolus from the bag  10 Units IntraVENous PRN Tonny Alva MD        ondansetron [facility] injection 4 mg  4 mg IntraVENous Q6H PRN Tonny Alva MD        docusate sodium (Lissy Ellie) capsule 100 mg  100 mg Oral BID Tucker Moreno MD           Allergies:     Allergies   Allergen Reactions    Amoxicillin      Yeast infection    Penicillins Hives       Problem List:    Patient Active Problem List   Diagnosis Code    Leukocytosis D72.829    Anemia D64.9    Ruptured epidermal cyst L72.0    31 weeks gestation of pregnancy Z3A.31    38 weeks gestation of pregnancy Z3A.38       Past Medical History:        Diagnosis Date    Anemia 11/17/2016       Past Surgical History:        Procedure Laterality Date    OTHER SURGICAL HISTORY      removal infection       Social History:    Social History     Tobacco Use    Smoking status: Every Day     Packs/day: 0.15     Types: Cigarettes    Smokeless tobacco: Never   Substance Use Topics    Alcohol use: Yes     Comment: twisted tea about 3 times week                                Ready to quit: Not Answered  Counseling given: Not Answered      Vital Signs (Current):   Vitals:    10/10/22 0945   BP: 119/77   Pulse: (!) 105   Resp: 16   Temp: 36.9 °C (98.4 °F)   TempSrc: Oral                                              BP Readings from Last 3 Encounters:   10/10/22 119/77   10/07/22 100/62   09/26/22 97/73       NPO Status:                                                                                 BMI:   Wt Readings from Last 3 Encounters:   10/07/22 133 lb 12.8 oz (60.7 kg)   09/26/22 131 lb 12.8 oz (59.8 kg)   09/12/22 132 lb (59.9 kg)     There is no height or weight on file to calculate BMI.    CBC:   Lab Results   Component Value Date/Time    WBC 11.3 10/10/2022 02:00 PM    RBC 4.00 10/10/2022 02:00 PM    HGB 11.1 10/10/2022 02:00 PM    HCT 35.8 10/10/2022 02:00 PM    MCV 89.5 10/10/2022 02:00 PM    RDW 16.0 10/10/2022 02:00 PM     10/10/2022 02:00 PM       CMP: No results found for: NA, K, CL, CO2, BUN, CREATININE, GFRAA, AGRATIO, LABGLOM, GLUCOSE, GLU, PROT, CALCIUM, BILITOT, ALKPHOS, AST, ALT    POC Tests: No results for input(s): POCGLU, POCNA, POCK, POCCL, POCBUN, POCHEMO, POCHCT in the last 72 hours. Coags: No results found for: PROTIME, INR, APTT    HCG (If Applicable):   Lab Results   Component Value Date    PREGTESTUR positive 2022        ABGs: No results found for: PHART, PO2ART, YMB6SFG, FXO0LNM, BEART, C0YCMHDD     Type & Screen (If Applicable):  No results found for: LABABO, LABRH    Drug/Infectious Status (If Applicable):  No results found for: HIV, HEPCAB    COVID-19 Screening (If Applicable): No results found for: COVID19        Anesthesia Evaluation  Patient summary reviewed and Nursing notes reviewed no history of anesthetic complications:   Airway:           Dental:      Comment: Denies loose or damaged teeth. Pulmonary:normal exam  breath sounds clear to auscultation  (+) asthma (as a child - stable): current smoker (vapes)                           Cardiovascular:  Exercise tolerance: good (>4 METS),           Rhythm: regular  Rate: normal           Beta Blocker:  Not on Beta Blocker         Neuro/Psych:   (+) headaches (used CBD prior to pregnancy):, depression/anxiety  (Used CBD prior to pregnancy)            GI/Hepatic/Renal:   (+) GERD (diet controlled): well controlled,           Endo/Other:    (+) blood dyscrasia: anemia:., .                 Abdominal:              PE comment:    Vascular: negative vascular ROS. Other Findings:           Anesthesia Plan      epidural, general and spinal     ASA 2     (Labor options discussed with patient. Does not want to receive labor epidural at this time.)        Anesthetic plan and risks discussed with patient. Use of blood products discussed with patient whom consented to blood products. Plan discussed with CRNA.                     Carmella Kauffman RN   10/10/2022

## 2022-10-10 NOTE — PROGRESS NOTES
Notified Dr. Nataliia Cuadra of patient arrival, SVE and CTX pattern. Recheck cervix in a couple hours, will update.

## 2022-10-10 NOTE — H&P
Obstetric History and Physical       CHIEF COMPLAINT:  contractions    HISTORY OF PRESENT ILLNESS:      Nadia Trejo is a 29 y.o., [id number], female who presents at 38w3d with an [facility] of Estimated Date of Delivery: 10/20/22.   OB History          3    Para   2    Term   1       1    AB        Living   2         SAB        IAB        Ectopic        Molar        Multiple        Live Births   2            Patient presents with a chief complaint as above and is being evaluated for labor  Cxs began around 5 AM and occurred every 10 minutes  She denies LOF, vaginal bleeding, decreased FM  She does have nasal congestion but says that she was checked out by a dr and it was just a little cold         PRENATAL CARE:    Complicated by: diarrhea, recent cold     PAST OB HISTORY  OB History          3    Para   2    Term   1       1    AB        Living   2         SAB        IAB        Ectopic        Molar        Multiple        Live Births   2                Past Medical History:        Diagnosis Date    Anemia 2016     Past Surgical History:        Procedure Laterality Date    OTHER SURGICAL HISTORY      removal infection     Allergies:  Amoxicillin and Penicillins  Social History:    Social History     Socioeconomic History    Marital status:      Spouse name: Not on file    Number of children: Not on file    Years of education: Not on file    Highest education level: Not on file   Occupational History    Not on file   Tobacco Use    Smoking status: Every Day     Packs/day: 0.15     Types: Cigarettes    Smokeless tobacco: Never   Vaping Use    Vaping Use: Every day    Substances: Nicotine   Substance and Sexual Activity    Alcohol use: Yes     Comment: twisted tea about 3 times week    Drug use: No    Sexual activity: Yes     Partners: Male   Other Topics Concern    Not on file   Social History Narrative    Not on file     Social Determinants of Health     Financial Resource Strain: Not on file   Food Insecurity: Not on file   Transportation Needs: Not on file   Physical Activity: Not on file   Stress: Not on file   Social Connections: Not on file   Intimate Partner Violence: Not on file   Housing Stability: Not on file     Family History:       Problem Relation Age of Onset    Cancer Maternal Uncle 54        colon cancer    Cancer Maternal Grandmother         bone cancer    Cancer Paternal Grandmother 58        stomach cancer    Cancer Paternal Grandfather         lubng and stomach cancer     Medications Prior to Admission:  Medications Prior to Admission: Prenatal Vit-Fe Fumarate-FA (PRENATAL VITAMINS PO), Take by mouth  Ascorbic Acid (VITAMIN C) 250 MG tablet, Take 1,000 mg by mouth daily  loratadine (CLARITIN) 10 MG capsule, Take 10 mg by mouth daily  ferrous sulfate 325 (65 FE) MG tablet, Take 1 tablet by mouth 2 times daily (with meals)    REVIEW OF SYSTEMS:    CONSTITUTIONAL:  negative  RESPIRATORY:  negative  CARDIOVASCULAR:  negative  GASTROINTESTINAL:  negative  ALLERGIC/IMMUNOLOGIC:  negative  NEUROLOGICAL:  negative  BEHAVIOR/PSYCH:  negative    PHYSICAL EXAM:  Vitals:    10/10/22 0945   BP: 119/77   Pulse: (!) 105   Resp: 16   Temp: 98.4 °F (36.9 °C)   TempSrc: Oral     General appearance:  awake, alert, cooperative, no apparent distress, and appears stated age  Neurologic:  Awake, alert, oriented to name, place and time. Lungs:  No increased work of breathing, good air exchange  Abdomen:  Soft, non tender, gravid, consistent with her gestational age, EFW by Leopald's manouever was 3100 g  Fetal heart rate:  Reassuring.   Pelvis:  Adequate pelvis  Cervix: 1-2 cm 50% medium -2  Contraction frequency:  every 3-4 minutes    Membranes:  Intact    ASSESSMENT AND PLAN:    Recheck in 1-2 hours for cervical change  MAGNO Orellana RN spoke with Dr. Jayce Randles to update him

## 2022-10-10 NOTE — PROGRESS NOTES
of viable male born at 26 by Dr. Ramo Perez using MityVac due to low fetal HR while pushing and mother fatigue. APGARS 8/9. Infant being cared for at warmer by nursing staff.

## 2022-10-10 NOTE — PROGRESS NOTES
Patient is a , 38.4 who presents to antepartum with c/o  of CTX that began around 0500 this morning. Patient states they are 10 min apart. Patient denies any LOF,  VB and states +FM. Patient placed on EFM and call light in reach.

## 2022-10-10 NOTE — PROGRESS NOTES
Notified Dr. Tosha Archer of patient SVE 4-5cm. Routine admission orders received. Patient moved to 2.

## 2022-10-10 NOTE — PROCEDURES
Tauna Sicard is a 29 y.o. female patient. No diagnosis found. Past Medical History:   Diagnosis Date    Anemia 11/17/2016     Blood pressure 119/77, pulse (!) 105, temperature 98.4 °F (36.9 °C), temperature source Oral, resp. rate 16, last menstrual period 01/13/2022, unknown if currently breastfeeding. Procedures  Patient delivered  male infantvia spontaneous vaginal under no anesthesia over no episiotomy at 16.20  Weighing peding Apgars 8-9. Placenta with 3VC. No  lacerations. Shoulder delivered without difficulty. EBL 200cc.       Lori Tucker MD  10/10/2022 4:48 PM    Lori Tucker MD  10/10/2022

## 2022-10-11 LAB
HCT VFR BLD CALC: 31.6 % (ref 34–48)
HEMOGLOBIN: 9.8 G/DL (ref 11.5–15.5)
MCH RBC QN AUTO: 28.1 PG (ref 26–35)
MCHC RBC AUTO-ENTMCNC: 31 % (ref 32–34.5)
MCV RBC AUTO: 90.5 FL (ref 80–99.9)
PDW BLD-RTO: 15.9 FL (ref 11.5–15)
PLATELET # BLD: 195 E9/L (ref 130–450)
PMV BLD AUTO: 11.8 FL (ref 7–12)
RBC # BLD: 3.49 E12/L (ref 3.5–5.5)
WBC # BLD: 12.9 E9/L (ref 4.5–11.5)

## 2022-10-11 PROCEDURE — 85027 COMPLETE CBC AUTOMATED: CPT

## 2022-10-11 PROCEDURE — 1220000000 HC SEMI PRIVATE OB R&B

## 2022-10-11 PROCEDURE — 6370000000 HC RX 637 (ALT 250 FOR IP): Performed by: OBSTETRICS & GYNECOLOGY

## 2022-10-11 PROCEDURE — 36415 COLL VENOUS BLD VENIPUNCTURE: CPT

## 2022-10-11 RX ORDER — CETIRIZINE HYDROCHLORIDE 10 MG/1
5 TABLET ORAL DAILY
Status: DISCONTINUED | OUTPATIENT
Start: 2022-10-11 | End: 2022-10-12 | Stop reason: HOSPADM

## 2022-10-11 RX ADMIN — DOCUSATE SODIUM 100 MG: 100 CAPSULE, LIQUID FILLED ORAL at 08:21

## 2022-10-11 RX ADMIN — HYDROCODONE BITARTRATE AND ACETAMINOPHEN 1 TABLET: 5; 325 TABLET ORAL at 20:37

## 2022-10-11 RX ADMIN — CETIRIZINE HYDROCHLORIDE 5 MG: 10 TABLET, FILM COATED ORAL at 20:37

## 2022-10-11 RX ADMIN — HYDROCODONE BITARTRATE AND ACETAMINOPHEN 1 TABLET: 5; 325 TABLET ORAL at 08:21

## 2022-10-11 RX ADMIN — IBUPROFEN 800 MG: 800 TABLET, FILM COATED ORAL at 15:41

## 2022-10-11 RX ADMIN — IBUPROFEN 800 MG: 800 TABLET, FILM COATED ORAL at 06:34

## 2022-10-11 ASSESSMENT — PAIN SCALES - GENERAL
PAINLEVEL_OUTOF10: 7
PAINLEVEL_OUTOF10: 5
PAINLEVEL_OUTOF10: 7
PAINLEVEL_OUTOF10: 4

## 2022-10-11 ASSESSMENT — PAIN - FUNCTIONAL ASSESSMENT
PAIN_FUNCTIONAL_ASSESSMENT: ACTIVITIES ARE NOT PREVENTED
PAIN_FUNCTIONAL_ASSESSMENT: ACTIVITIES ARE NOT PREVENTED

## 2022-10-11 ASSESSMENT — PAIN DESCRIPTION - DESCRIPTORS
DESCRIPTORS: ACHING;SORE
DESCRIPTORS: ACHING;CRAMPING;DISCOMFORT

## 2022-10-11 ASSESSMENT — PAIN DESCRIPTION - LOCATION
LOCATION: PELVIS;LEG
LOCATION: ABDOMEN

## 2022-10-11 ASSESSMENT — PAIN DESCRIPTION - ORIENTATION: ORIENTATION: LOWER

## 2022-10-11 NOTE — PLAN OF CARE
Problem: Discharge Planning  Goal: Discharge to home or other facility with appropriate resources  10/11/2022 0246 by Dorothea Moore RN  Outcome: Progressing     Problem: Vaginal Birth or  Section  Goal: Fetal and maternal status remain reassuring during the birth process  Description:  Birth OB-Pregnancy care plan goal which identifies if the fetal and maternal status remain reassuring during the birth process  10/11/2022 0246 by Dorothea Moore RN  Outcome: Progressing     Problem: Pain  Goal: Verbalizes/displays adequate comfort level or baseline comfort level  10/11/2022 0246 by Dorothea Moore RN  Outcome: Progressing     Problem: Safety - Adult  Goal: Free from fall injury  10/11/2022 0246 by Dorothea Moore RN  Outcome: Progressing

## 2022-10-11 NOTE — PROGRESS NOTES
[facility] [place] [facility] screening results were discussed with parent. Questions answered. Brochure given to parent. Advised to monitor developmental milestones and contact physician for any concerns.    Nneka Serrano

## 2022-10-11 NOTE — PROGRESS NOTES
Admitted to room 308 from L&D via wheelchair with infant and accompanied by RN from L&D. Oriented to call light at bedside, RN cell number, Doctor's orders, need to call for help with first out of bed to void, infant safety and security, pamphlets at bedside, visitation policy of one overnight visitor over the age of 25, and ordering meals.

## 2022-10-11 NOTE — PROGRESS NOTES
Progress Note    SUBJECTIVE: patient status post vaginal delivery day 1 doing well ,no complaints normal postpartum course    OBJECTIVE:    VITALS:  /64   Pulse 68   Temp 97.7 °F (36.5 °C) (Oral)   Resp 16   LMP 01/13/2022 (Approximate)   SpO2 97%   Breastfeeding Unknown   Physical Exam  lung:cta  Heart : regular rythm  Abdomen:soft non tender ,firm uterus ,bs present  Extremities :normal no evidence of DVT  DATA:  CBC:   Lab Results   Component Value Date/Time    WBC 12.9 10/11/2022 05:39 AM    RBC 3.49 10/11/2022 05:39 AM    HGB 9.8 10/11/2022 05:39 AM    HCT 31.6 10/11/2022 05:39 AM    MCV 90.5 10/11/2022 05:39 AM    MCH 28.1 10/11/2022 05:39 AM    MCHC 31.0 10/11/2022 05:39 AM    RDW 15.9 10/11/2022 05:39 AM     10/11/2022 05:39 AM    MPV 11.8 10/11/2022 05:39 AM       ASSESSMENT AND PLAN:  Patient Active Problem List   Diagnosis    Leukocytosis    Anemia    Ruptured epidermal cyst    31 weeks gestation of pregnancy    38 weeks gestation of pregnancy       Normal post partum care   Anticipate discharge home

## 2022-10-11 NOTE — PROGRESS NOTES
Pt requesting claritin   Answering service paged and new order received pt may continue home medication

## 2022-10-12 VITALS
SYSTOLIC BLOOD PRESSURE: 109 MMHG | HEART RATE: 85 BPM | RESPIRATION RATE: 18 BRPM | TEMPERATURE: 98.6 F | OXYGEN SATURATION: 97 % | DIASTOLIC BLOOD PRESSURE: 60 MMHG

## 2022-10-12 PROCEDURE — 6370000000 HC RX 637 (ALT 250 FOR IP): Performed by: OBSTETRICS & GYNECOLOGY

## 2022-10-12 RX ADMIN — IBUPROFEN 800 MG: 800 TABLET, FILM COATED ORAL at 05:51

## 2022-10-12 RX ADMIN — HYDROCODONE BITARTRATE AND ACETAMINOPHEN 1 TABLET: 5; 325 TABLET ORAL at 14:53

## 2022-10-12 RX ADMIN — DOCUSATE SODIUM 100 MG: 100 CAPSULE, LIQUID FILLED ORAL at 08:37

## 2022-10-12 ASSESSMENT — PAIN SCALES - GENERAL: PAINLEVEL_OUTOF10: 5

## 2022-10-12 NOTE — PROGRESS NOTES
Progress Note    SUBJECTIVE: patient status post vaginal delivery day 2 doing well ,no complaints normal postpartum course    OBJECTIVE:    VITALS:  BP (!) 102/55   Pulse 78   Temp 98.6 °F (37 °C) (Oral)   Resp 18   LMP 01/13/2022 (Approximate)   SpO2 98%   Breastfeeding Unknown   Physical Exam  lung:cta  Heart : regular rythm  Abdomen:soft non tender ,firm uterus ,bs present  Extremities :normal no evidence of DVT  DATA:  CBC:   Lab Results   Component Value Date/Time    WBC 12.9 10/11/2022 05:39 AM    RBC 3.49 10/11/2022 05:39 AM    HGB 9.8 10/11/2022 05:39 AM    HCT 31.6 10/11/2022 05:39 AM    MCV 90.5 10/11/2022 05:39 AM    MCH 28.1 10/11/2022 05:39 AM    MCHC 31.0 10/11/2022 05:39 AM    RDW 15.9 10/11/2022 05:39 AM     10/11/2022 05:39 AM    MPV 11.8 10/11/2022 05:39 AM       ASSESSMENT AND PLAN:  Patient Active Problem List   Diagnosis    Leukocytosis    Anemia    Ruptured epidermal cyst    31 weeks gestation of pregnancy    38 weeks gestation of pregnancy       Normal post partum care   Anticipate discharge home

## 2022-10-12 NOTE — CARE COORDINATION
LENNY Discharge Planning   SW received consult for \" PPD 10\"    LENNY met with Marylou and provided her with information, education and resources for post partum depression. We spoke at length as Marylou discussed her struggles as a single mother, and LENNY provided support. We discussed options of tele health counseling as well as in person counseling. Marylou was receptive to counseling resources and post partum information. Marylou declined any other needs at this time and did report having the support of her grandmother and the father of her two oldest children.      Electronically signed by GEN Sneed on 10/12/2022 at 11:17 AM

## 2022-10-12 NOTE — DISCHARGE INSTRUCTIONS
Follow up with your OB doctor in  1 week for a  delivery or in 6 weeks for a vaginal delivery unless otherwise instructed. Call office for appointment. .  For breastfeeding support, you can contact our lactation specialists at [phone] or   [phone]. DIET  Eat a well balanced diet focusing on foods high in fiber and protein  Drink plenty of fluids especially water. To avoid constipation you may take a mild stool softener as recommended by your doctor or midwife. ACTIVITY  Gradually increase your activity. Resume exercise regimen only after advised by your doctor or midwife. Avoid lifting anything heavier than your baby or a gallon of milk until OK'd by your physician or midlife. Avoid driving until your doctor or midwife has given their approval.  Angel Luis Dinesh slowly from a lying to sitting and then a standing position. Climb stairs one at a time. Use caution when carrying your baby up and down the stairs. No sexual activity for 6 weeks or until advised by your doctor - Nothing in vagina: intercourse, tampons, or douching. Be prepared to discuss family planning at your follow-up OB visit. You may feel tired or have a lack of energy. You may continue your prenatal vitamin to replenish nutrients post delivery. Nap when infant naps to catch up on sleep. May return to work or school in 6 weeks or as directed by physician or midlife. Take pain medication as prescribed whenever you need them  Take care of yourself by sleeping/resting as much as possible. Let someone else care for you, your infant and housework as much as possible for a while. EMOTIONS  You may feed mo[place]uma, sad, teary, & overwhelmed. If infant will not stop crying, contact another adult for help or place infant in their crib on their back and take a break. NEVER shake your infant.     Call your doctor if you have unrelieved feelings of: inability to cope, anxiety, lack of interest in the infant/family, eating and sleeping disturbances,     BLEEDING  Vaginal bleeding will decrease in amount over the next few weeks. It should be like the end of your period like a brownish discharge. No different odor or color than a regular period. You will notice that as your activity increases, your flow may increase. This is your body's way of telling you, you need to take things easier and rest more often. Abdominal cramping should not get any worse than it is now. Take your pain medications as needed for the next few days. BREAST CARE  For breastfeeding moms:  If you become engorged, feeding may be more difficult or painful for 1-2 days. You may find it helpful to hand express some milk so that the infant can latch on more easily. While breastfeeding, continue to take your prenatal vitamins as directed by your doctor or midwife. Only take medications verified as safe for breastfeeding. If you start any new medications other than the ones you have had in the hospital, contact your pediatrician to see if they are safe for breastfeeding. Wear a support bra 24/7. You can pump your milk after breast feeding and freeze milk for six months in the freezer. When you are ready to use it, thaw it out in the refrigerator and use in 24 hours. Label the containers made for breast milk with day and time of pumping. Establish breast feeding for 2 weeks before you pump breast milk to save. Use your lanolin ointment/cream on your nipples after baby nurses. You need not wipe it off when baby nurses again. There are nipple shields and disks for sore nipples. Babies should eat every two to three hours. Avoid gassy foods (cabbage, onions, saurkraut, baked beans, cauliflower, broccoli) and spicy [place] or [place] foods. They might give the baby gas or make your milk taste funny to baby.  But if you have a craving, eat the food and see if it affects the baby and if it does, delete it from your diet. If it does not affect the baby, go ahead and eat it. Avoid caffeine at bedtime. (chocolate has a lot of caffeine) if the baby is sensitive to it. For non-breastfeeding moms:  You may apply ice packs to your breasts over your bra for twenty minutes at a time for comfort. Avoid stimulation to your breasts, when showering allow the water to strike your back not your breasts. Wear a good fitting bra until your milk dries, such as a sports bra. If your breasts are very sore, buy a cabbage and wet some of the inner leaves and place in your bra over your breasts. Your breasts may feel warm when your milk comes in. This is normal.    INCISIONAL CARE / COLTON CARE  Clean your incision in the shower with mild soap. After shower pat the incision area dry and leave open to air. If used, Steri-stipes should fall off in shower by 2 weeks. If used, Ezzard Parag should be removed by the OB in office by 1 week. If used/ordered, an abdominal binder may provide support for your incision. Use the colton-bottle after toileting until bleeding stops. It promotes healing and prevents infection. You are prone to urinary tract infections after a delivery ( c section or vaginal delivery). Drink a lot of fluids. Take a shower instead of a tub bath until bleeding stops. Cleanse your perineum from front to back  If used, stitches or internal clips will dissolve in 4-6 weeks. You may use a sitz bath or soak in a clean tub as needed for comfort. Kegel exercises will help restore bladder control. You may use cool compress on incision site. SWELLING   It takes about 2 weeks to get rid of all of the extra fluid you have from having a baby. Your feet and hands may swell up more than they are now. Keep your legs elevated when sitting or lying. When wearing stocking or socks, make sure they are not too tight. WHEN TO CALL THE DOCTOR  If you have a temp of 100.4 or more.    Your abdomen is tender to touch.  You are passing blood clots bigger than the size of a lemon. If you are experiencing extreme weakness or dizziness. If you are having flu-like symptoms such as achy muscles or joints. If you have pain that cannot be relieved. You have persistent burning with urination or frequency. You have swelling, bleeding, drainage, foul odor, redness, or warmth in/around your incision or stitches. You have a red, warm, tender area in you calf. Call if you have concerns about your well-being or if you feel you may be showing signs of post partum depression, or have thoughts of harming yourself or infant. Increased pain at the site of the episiotomy. Difficulty urinating. Difficulty breathing with or without chest pain. Headache not relieved by pain meds. If you are saturating more than one maxi pad in an hour, foul smelling vaginal discharge, sudden continuing increased vaginal bleeding with or without clots. If you develop a warm, red, tender area on your breast, have nipple discharge which is foul smelling or contains pus, or develop a fever. NEVER SHAKE A BABY PROMISE. Shaking can kill a baby. It can also cause seizures, brain damage, learning problems,  Cerebral palsy, blindness and other serious health and developmental problems. I (have seen) (am aware of) the video about shaking a baby and understand that shaking a baby is a serious form of child abuse. I PROMISE NEVER TO SHAKE MY BABY    I understand that caregivers other than the mother often shakes babies. I also promise to discuss the dangers of shaking a baby with everyone who takes care of my baby. I promise to tell anyone who care for my baby to never, never shake my baby.

## 2022-10-12 NOTE — PLAN OF CARE
Problem: Vaginal Birth or  Section  Goal: Fetal and maternal status remain reassuring during the birth process  Description:  Birth OB-Pregnancy care plan goal which identifies if the fetal and maternal status remain reassuring during the birth process  Outcome: Progressing     Problem: Pain  Goal: Verbalizes/displays adequate comfort level or baseline comfort level  Outcome: Progressing  Flowsheets (Taken 10/11/2022 1541 by Naila Gurrola RN)  Verbalizes/displays adequate comfort level or baseline comfort level:   Encourage patient to monitor pain and request assistance   Assess pain using appropriate pain scale     Problem: Safety - Adult  Goal: Free from fall injury  Outcome: Progressing

## 2023-01-08 NOTE — DISCHARGE SUMMARY
Obstetric Discharge Summary    Admitting Diagnosis  IUP 38 weeks  OB History as of 2022          3    Para   3    Term   2       1    AB        Living   3         SAB        IAB        Ectopic        Molar        Multiple   0    Live Births   3                Reasons for Admission on 10/10/2022  9:28 AM  38 weeks gestation of pregnancy [Z3A.38]  No comment available  Onset of Labor    Prenatal Procedures  None    Intrapartum Procedures                 Spontaneous Vaginal Delivery: See Labor and Delivery Summary       Postpartum Procedures  None    Postpartum/Operative Complications       [place] Data  Information for the patient's :  Mendez Greene [[id number]]   male   Birth Weight: 7 lb 15.3 oz (3.61 kg)   Discharge With Mother  Complications: No    Discharge Diagnosis       Discharge Information  Discharge Medication List as of 10/12/2022  5:49 PM        CONTINUE these medications which have NOT CHANGED    Details   Prenatal Vit-Fe Fumarate-FA (PRENATAL VITAMINS PO) Take by mouthHistorical Med           STOP taking these medications       Ascorbic Acid (VITAMIN C) 250 MG tablet Comments:   Reason for Stopping:         loratadine (CLARITIN) 10 MG capsule Comments:   Reason for Stopping:         ferrous sulfate 325 (65 FE) MG tablet Comments:   Reason for Stopping:               No discharge procedures on file.     Discharge to: Home  Follow up in 6 weeks       Comments

## 2023-03-30 ENCOUNTER — HOSPITAL ENCOUNTER (EMERGENCY)
Dept: HOSPITAL 83 - ED | Age: 33
Discharge: HOME | End: 2023-03-30
Payer: COMMERCIAL

## 2023-03-30 VITALS — HEIGHT: 55 IN | WEIGHT: 105 LBS

## 2023-03-30 DIAGNOSIS — L03.114: Primary | ICD-10-CM

## 2023-03-30 DIAGNOSIS — Z88.0: ICD-10-CM
